# Patient Record
Sex: MALE | URBAN - METROPOLITAN AREA
[De-identification: names, ages, dates, MRNs, and addresses within clinical notes are randomized per-mention and may not be internally consistent; named-entity substitution may affect disease eponyms.]

---

## 2023-12-01 ENCOUNTER — LAB (OUTPATIENT)
Dept: LAB | Facility: HOSPITAL | Age: 78
End: 2023-12-01
Attending: PHYSICAL MEDICINE & REHABILITATION
Payer: OTHER GOVERNMENT

## 2023-12-01 ENCOUNTER — HOSPITAL ENCOUNTER (OUTPATIENT)
Dept: CARDIOLOGY | Facility: HOSPITAL | Age: 78
Discharge: HOME | End: 2023-12-01
Attending: PHYSICAL MEDICINE & REHABILITATION
Payer: OTHER GOVERNMENT

## 2023-12-01 DIAGNOSIS — I25.9 CHRONIC ISCHEMIC HEART DISEASE, UNSPECIFIED: Primary | ICD-10-CM

## 2023-12-01 DIAGNOSIS — E11.9 DIABETES MELLITUS (H): ICD-10-CM

## 2023-12-01 DIAGNOSIS — I25.9 ISCHEMIC HEART DISEASE: ICD-10-CM

## 2023-12-01 LAB
ALBUMIN SERPL BCG-MCNC: 4.2 G/DL (ref 3.5–5.2)
ALBUMIN UR-MCNC: 10 MG/DL
ALP SERPL-CCNC: 86 U/L (ref 40–150)
ALT SERPL W P-5'-P-CCNC: 17 U/L (ref 0–70)
ANION GAP SERPL CALCULATED.3IONS-SCNC: 6 MMOL/L (ref 7–15)
APPEARANCE UR: CLEAR
AST SERPL W P-5'-P-CCNC: 23 U/L (ref 0–45)
BILIRUB SERPL-MCNC: 0.7 MG/DL
BILIRUB UR QL STRIP: NEGATIVE
BUN SERPL-MCNC: 14 MG/DL (ref 8–23)
CALCIUM SERPL-MCNC: 8.9 MG/DL (ref 8.8–10.2)
CHLORIDE SERPL-SCNC: 95 MMOL/L (ref 98–107)
COLOR UR AUTO: YELLOW
CREAT SERPL-MCNC: 0.69 MG/DL (ref 0.67–1.17)
DEPRECATED HCO3 PLAS-SCNC: 32 MMOL/L (ref 22–29)
EGFRCR SERPLBLD CKD-EPI 2021: >90 ML/MIN/1.73M2
GLUCOSE SERPL-MCNC: 96 MG/DL (ref 70–99)
GLUCOSE UR STRIP-MCNC: NEGATIVE MG/DL
HGB UR QL STRIP: NEGATIVE
KETONES UR STRIP-MCNC: NEGATIVE MG/DL
LEUKOCYTE ESTERASE UR QL STRIP: NEGATIVE
LVEF ECHO: NORMAL
MUCOUS THREADS #/AREA URNS LPF: PRESENT /LPF
NITRATE UR QL: NEGATIVE
PH UR STRIP: 7.5 [PH] (ref 5–7)
POTASSIUM SERPL-SCNC: 4.4 MMOL/L (ref 3.4–5.3)
PROT SERPL-MCNC: 6.5 G/DL (ref 6.4–8.3)
RBC URINE: 1 /HPF
SODIUM SERPL-SCNC: 133 MMOL/L (ref 135–145)
SP GR UR STRIP: 1.02 (ref 1–1.03)
UROBILINOGEN UR STRIP-MCNC: 2 MG/DL
WBC URINE: <1 /HPF

## 2023-12-01 PROCEDURE — 93306 TTE W/DOPPLER COMPLETE: CPT | Mod: 26 | Performed by: INTERNAL MEDICINE

## 2023-12-01 PROCEDURE — 93306 TTE W/DOPPLER COMPLETE: CPT

## 2023-12-01 PROCEDURE — 36415 COLL VENOUS BLD VENIPUNCTURE: CPT

## 2023-12-01 PROCEDURE — 81001 URINALYSIS AUTO W/SCOPE: CPT

## 2023-12-01 PROCEDURE — 80053 COMPREHEN METABOLIC PANEL: CPT

## 2024-03-14 ENCOUNTER — MEDICAL CORRESPONDENCE (OUTPATIENT)
Dept: HEALTH INFORMATION MANAGEMENT | Facility: CLINIC | Age: 79
End: 2024-03-14
Payer: COMMERCIAL

## 2024-03-28 ENCOUNTER — LAB (OUTPATIENT)
Dept: LAB | Facility: HOSPITAL | Age: 79
End: 2024-03-28
Payer: OTHER GOVERNMENT

## 2024-03-28 DIAGNOSIS — E11.9 DIABETES MELLITUS (H): Primary | ICD-10-CM

## 2024-03-28 LAB
FASTING STATUS PATIENT QL REPORTED: YES
GLUCOSE SERPL-MCNC: 138 MG/DL (ref 70–99)
NON GESTATIONAL GTT 2 HR POST DOSE: 90 MG/DL (ref 60–199)
NON GESTATIONAL GTT FASTING: 97 MG/DL (ref 60–125)

## 2024-03-28 PROCEDURE — 82947 ASSAY GLUCOSE BLOOD QUANT: CPT

## 2024-03-28 PROCEDURE — 36415 COLL VENOUS BLD VENIPUNCTURE: CPT

## 2024-03-28 PROCEDURE — 82950 GLUCOSE TEST: CPT

## 2025-07-21 ENCOUNTER — OFFICE VISIT (OUTPATIENT)
Dept: URGENT CARE | Facility: URGENT CARE | Age: 80
End: 2025-07-21
Payer: OTHER GOVERNMENT

## 2025-07-21 VITALS
RESPIRATION RATE: 16 BRPM | HEIGHT: 74 IN | BODY MASS INDEX: 30.43 KG/M2 | TEMPERATURE: 97.5 F | WEIGHT: 237.1 LBS | HEART RATE: 64 BPM | DIASTOLIC BLOOD PRESSURE: 75 MMHG | OXYGEN SATURATION: 97 % | SYSTOLIC BLOOD PRESSURE: 151 MMHG

## 2025-07-21 DIAGNOSIS — R06.09 DYSPNEA ON EXERTION: ICD-10-CM

## 2025-07-21 DIAGNOSIS — Z98.890 STATUS POST HIP SURGERY: ICD-10-CM

## 2025-07-21 DIAGNOSIS — M79.89 LEFT LEG SWELLING: Primary | ICD-10-CM

## 2025-07-21 DIAGNOSIS — L03.116 CELLULITIS OF LEFT LOWER EXTREMITY: ICD-10-CM

## 2025-07-21 PROCEDURE — 99205 OFFICE O/P NEW HI 60 MIN: CPT | Performed by: EMERGENCY MEDICINE

## 2025-07-21 ASSESSMENT — PAIN SCALES - GENERAL: PAINLEVEL_OUTOF10: NO PAIN (0)

## 2025-07-21 NOTE — PROGRESS NOTES
Urgent Care Clinic Visit    Chief Complaint   Patient presents with    Pain     Left foot, left ankle swollen, and right foot swollen x1.               7/21/2025     2:12 PM   Additional Questions   Roomed by Mica MCFADDEN   Accompanied by self

## 2025-07-21 NOTE — PROGRESS NOTES
Assessment & Plan     Diagnosis:    ICD-10-CM    1. Left leg swelling  M79.89       2. Dyspnea on exertion  R06.09       3. Cellulitis of left lower extremity  L03.116       4. Status post hip surgery  Z98.890           Medical Decision Making:  Camilo Collins is a 79 year old male who presents with bilateral leg swelling; L > R. Has had some mild shortness of breath over the past 2 weeks as well.  He is ~5 weeks out from femoral neck surgery in the left lower extremity.    The differential diagnosis for lower extremity edema is:    Cardiac: CHF (right sided), effusion/pericarditis, valvular disease: No hypoxia, lungs clear, no history of same, no bilateral leg swelling, no muffled tones, no history of systemic illness, no pre-syncope symptoms.    Non-Cardiac: DVT, Cirrhosis, nephrotic syndrome, cellulitis, Baker's cyst, compartment syndrome, lymphatic obstruction, myxedema.    Patient does have some redness to the left calf, is also swollen, more so than the right lower extremity.  Given patient's new dyspnea on exertion, recent surgery, concern for DVT/PE;   Patient directed to go to the ER now for further evaluation. Questions answered.    Sushant Thornton PA-C  [facility]    Subjective     Camlio Collins is a 79 year old male who presents to clinic today for the following health issues:  Chief Complaint   Patient presents with    Pain     Left foot, left ankle swollen, and right foot swollen x1.        HPI  Patient reports that he had a femur fracture and is about 5 weeks status post surgical fixation.  In the last 2 weeks he has noticed slight shortness of breath, increased swelling in his legs, left greater than right.  He also noticed some redness around his ankle and is concerned for possible infection.  He is not on blood thinners, was on baby aspirin for a month but is not on this any longer.  No history of blood clots.  No chest pain, shortness of breath at rest or with minimal  "exertion, nausea, vomiting, diarrhea. No cough or URI symptoms. No fever or red streaks going up the legs. No history of heart problems he is aware of. No heart attack or congestive heart failure.     Review of Systems    See HPI    Objective      Vitals: BP (!) 151/75 (BP Location: Left arm, Patient Position: Sitting, Cuff Size: Adult Large)   Pulse 64   Temp 97.5  F (36.4  C) (Oral)   Resp 16   Ht 1.88 m (6' 2\")   Wt 107.5 kg (237 lb 1.6 oz)   SpO2 97%   BMI 30.44 kg/m        Patient Vitals for the past 24 hrs:   BP Temp Temp src Pulse Resp SpO2 Height Weight   07/21/25 1411 (!) 151/75 97.5  F (36.4  C) Oral 64 16 97 % 1.88 m (6' 2\") 107.5 kg (237 lb 1.6 oz)       Vital signs reviewed by: Sushant Thornton PA-C    Physical Exam   Constitutional: Patient is alert and cooperative. No acute distress.  Cardiovascular: Regular rate and rhythm  Pulmonary/Chest: Lungs are clear to auscultation throughout. Effort normal. No respiratory distress. No wheezes, rales or rhonchi.  Neurological: Alert and oriented x3. CN 3-7 and 9-12 intact. Strength and sensation are intact and symmetric in the bilateral upper and lower extremities.   MSK/Skin: Bilateral lower extremities are swollen, left greater than right.  Left ankle with some erythema, tenderness.  No lymphangitis.  Normal range of motion at the knee and ankle.  Psychiatric:The patient has a normal mood and affect.     Sushant Thornton PA-C, July 21, 2025        "